# Patient Record
Sex: FEMALE | Race: WHITE | NOT HISPANIC OR LATINO | URBAN - METROPOLITAN AREA
[De-identification: names, ages, dates, MRNs, and addresses within clinical notes are randomized per-mention and may not be internally consistent; named-entity substitution may affect disease eponyms.]

---

## 2017-02-13 ENCOUNTER — IMPORTED ENCOUNTER (OUTPATIENT)
Dept: URBAN - METROPOLITAN AREA CLINIC 9 | Facility: CLINIC | Age: 77
End: 2017-02-13

## 2017-03-07 ENCOUNTER — IMPORTED ENCOUNTER (OUTPATIENT)
Dept: URBAN - METROPOLITAN AREA CLINIC 9 | Facility: CLINIC | Age: 77
End: 2017-03-07

## 2017-11-14 ENCOUNTER — IMPORTED ENCOUNTER (OUTPATIENT)
Dept: URBAN - METROPOLITAN AREA CLINIC 9 | Facility: CLINIC | Age: 77
End: 2017-11-14

## 2017-12-07 NOTE — PATIENT DISCUSSION
Reassurance.  Patient instructed to give more time to adapt to [facility] IOL.

## 2018-04-24 NOTE — PROCEDURE NOTE: SURGICAL
Prior to commencing surgery patient identification, surgical procedure, site, and side were confirmed by Dr. Earline Arrieta. Following topical proparacaine anesthesia, the patient was positioned at the YAG laser, a contact lens coupled to the cornea with methylcellulose and an axial posterior capsulotomy performed without complication using 3.0 Mj x 27. Excess methylcellulose was washed from the eye, one drop of Alphagan was instilled and the patient returned to the holding area having tolerated the procedure well and without complication. <br />[id number]

## 2018-05-04 NOTE — PATIENT DISCUSSION
Patient shown trial frame to show that OS is causing decrease in distance vision OU.  Patient informed on monofocal iol vs Symfony IOL and its affects on vision at distance and near.   Patient was shown what near vision would look like with monofocal iol with Lela goal OD.

## 2018-05-22 ENCOUNTER — IMPORTED ENCOUNTER (OUTPATIENT)
Dept: URBAN - METROPOLITAN AREA CLINIC 9 | Facility: CLINIC | Age: 78
End: 2018-05-22

## 2018-10-23 ENCOUNTER — IMPORTED ENCOUNTER (OUTPATIENT)
Dept: URBAN - METROPOLITAN AREA CLINIC 9 | Facility: CLINIC | Age: 78
End: 2018-10-23

## 2019-02-05 ENCOUNTER — IMPORTED ENCOUNTER (OUTPATIENT)
Dept: URBAN - METROPOLITAN AREA CLINIC 9 | Facility: CLINIC | Age: 79
End: 2019-02-05

## 2019-08-13 ENCOUNTER — IMPORTED ENCOUNTER (OUTPATIENT)
Dept: URBAN - METROPOLITAN AREA CLINIC 9 | Facility: CLINIC | Age: 79
End: 2019-08-13

## 2020-02-11 ENCOUNTER — IMPORTED ENCOUNTER (OUTPATIENT)
Dept: URBAN - METROPOLITAN AREA CLINIC 9 | Facility: CLINIC | Age: 80
End: 2020-02-11

## 2020-08-11 ENCOUNTER — IMPORTED ENCOUNTER (OUTPATIENT)
Dept: URBAN - METROPOLITAN AREA CLINIC 9 | Facility: CLINIC | Age: 80
End: 2020-08-11

## 2021-04-06 ENCOUNTER — IMPORTED ENCOUNTER (OUTPATIENT)
Dept: URBAN - METROPOLITAN AREA CLINIC 9 | Facility: CLINIC | Age: 81
End: 2021-04-06

## 2021-04-06 PROBLEM — H26.491: Noted: 2021-04-06

## 2021-04-06 PROBLEM — H01.021: Noted: 2021-04-06

## 2021-04-06 PROBLEM — H35.341: Noted: 2021-04-06

## 2021-04-06 PROBLEM — INACTIVE: Status: INACTIVE | Noted: 2021-04-06

## 2021-04-06 PROBLEM — H35.3131: Noted: 2021-04-06

## 2021-04-06 PROBLEM — H02.421: Noted: 2021-04-06

## 2021-04-06 PROBLEM — H01.024: Noted: 2021-04-06

## 2021-04-06 PROBLEM — H04.123: Noted: 2021-04-06

## 2021-10-18 ASSESSMENT — VISUAL ACUITY
OS_SC: 20/20 - SN
OD_SC: 20/25 +2 SN
OS_CC: 20/20 SN
OS_SC: 20/20 - SN
OD_SC: 20/20 SN
OD_CC: 20/20 SN
OD_CC: 20/25 - SN
OD_SC: 20/20 SN
OS_SC: 20/25 + SN
OS_SC: 20/20 SN
OS_SC: 20/20 - SN
OS_SC: 20/20 - SN
OD_SC: 20/25 SN
OS_SC: 20/20 - SN
OD_SC: 20/20 SN
OD_SC: 20/20 - SN
OS_SC: 20/20 -2 SN
OD_SC: 20/20 SN
OD_SC: 20/20 SN
OS_CC: 20/20 SN
OD_SC: 20/20 - SN
OS_SC: 20/20 SN
OS_SC: 20/20 SN
OD_SC: 20/20 SN

## 2021-10-18 ASSESSMENT — TONOMETRY
OS_IOP_MMHG: 13
OD_IOP_MMHG: 13
OD_IOP_MMHG: 11
OS_IOP_MMHG: 13
OS_IOP_MMHG: 14
OD_IOP_MMHG: 11
OS_IOP_MMHG: 12
OD_IOP_MMHG: 14
OS_IOP_MMHG: 11
OS_IOP_MMHG: 11
OS_IOP_MMHG: 14
OD_IOP_MMHG: 11
OD_IOP_MMHG: 14
OD_IOP_MMHG: 14
OD_IOP_MMHG: 13
OD_IOP_MMHG: 8
OS_IOP_MMHG: 10
OS_IOP_MMHG: 15

## 2021-10-18 ASSESSMENT — KERATOMETRY
OD_K1POWER_DIOPTERS: 45.25
OD_K2POWER_DIOPTERS: 45.5
OD_AXISANGLE2_DEGREES: 68
OS_K2POWER_DIOPTERS: 46.25
OS_AXISANGLE_DEGREES: 164
OS_AXISANGLE2_DEGREES: 74
OD_AXISANGLE_DEGREES: 158
OS_K1POWER_DIOPTERS: 45.25

## 2021-10-20 ENCOUNTER — PREPPED CHART (OUTPATIENT)
Dept: URBAN - METROPOLITAN AREA CLINIC 17 | Facility: CLINIC | Age: 81
End: 2021-10-20

## 2021-10-25 ENCOUNTER — ESTABLISHED COMPREHENSIVE EXAM (OUTPATIENT)
Dept: URBAN - METROPOLITAN AREA CLINIC 17 | Facility: CLINIC | Age: 81
End: 2021-10-25

## 2021-10-25 DIAGNOSIS — H26.491: ICD-10-CM

## 2021-10-25 DIAGNOSIS — H35.3131: ICD-10-CM

## 2021-10-25 PROCEDURE — 92134 CPTRZ OPH DX IMG PST SGM RTA: CPT

## 2021-10-25 PROCEDURE — 92014 COMPRE OPH EXAM EST PT 1/>: CPT

## 2021-10-25 ASSESSMENT — VISUAL ACUITY
OS_SC: 20/20-1
OD_SC: 20/20

## 2021-10-25 ASSESSMENT — TONOMETRY
OD_IOP_MMHG: 16
OS_IOP_MMHG: 16

## 2022-06-14 ENCOUNTER — ESTABLISHED PATIENT (OUTPATIENT)
Dept: URBAN - METROPOLITAN AREA CLINIC 17 | Facility: CLINIC | Age: 82
End: 2022-06-14

## 2022-06-14 DIAGNOSIS — H35.3131: ICD-10-CM

## 2022-06-14 DIAGNOSIS — H43.813: ICD-10-CM

## 2022-06-14 DIAGNOSIS — H26.491: ICD-10-CM

## 2022-06-14 PROCEDURE — 92134 CPTRZ OPH DX IMG PST SGM RTA: CPT

## 2022-06-14 PROCEDURE — 92014 COMPRE OPH EXAM EST PT 1/>: CPT

## 2022-06-14 ASSESSMENT — VISUAL ACUITY
OD_GLARE: 20/400
OD_SC: 20/25+1
OS_SC: 20/20-2

## 2022-06-14 ASSESSMENT — TONOMETRY
OS_IOP_MMHG: 13
OD_IOP_MMHG: 14

## 2022-06-20 RX ORDER — LEVOTHYROXINE SODIUM 0.07 MG/1
TABLET ORAL
COMMUNITY

## 2022-06-20 RX ORDER — MECLIZINE HYDROCHLORIDE 25 MG/1
TABLET ORAL
COMMUNITY

## 2023-06-13 ENCOUNTER — ESTABLISHED PATIENT (OUTPATIENT)
Dept: URBAN - METROPOLITAN AREA CLINIC 17 | Facility: CLINIC | Age: 83
End: 2023-06-13

## 2023-06-13 DIAGNOSIS — H26.491: ICD-10-CM

## 2023-06-13 DIAGNOSIS — H35.3131: ICD-10-CM

## 2023-06-13 DIAGNOSIS — H04.123: ICD-10-CM

## 2023-06-13 DIAGNOSIS — H43.813: ICD-10-CM

## 2023-06-13 PROCEDURE — 92014 COMPRE OPH EXAM EST PT 1/>: CPT

## 2023-06-13 PROCEDURE — 92134 CPTRZ OPH DX IMG PST SGM RTA: CPT

## 2023-06-13 ASSESSMENT — VISUAL ACUITY
OD_CC: J2
OS_CC: J1
OS_SC: 20/20-2
OD_SC: 20/25-1

## 2023-06-13 ASSESSMENT — TONOMETRY
OS_IOP_MMHG: 15
OD_IOP_MMHG: 17

## 2024-11-05 ENCOUNTER — COMPREHENSIVE EXAM (OUTPATIENT)
Dept: URBAN - METROPOLITAN AREA CLINIC 17 | Facility: CLINIC | Age: 84
End: 2024-11-05

## 2024-11-05 DIAGNOSIS — H01.021: ICD-10-CM

## 2024-11-05 DIAGNOSIS — H43.813: ICD-10-CM

## 2024-11-05 DIAGNOSIS — H35.3131: ICD-10-CM

## 2024-11-05 DIAGNOSIS — H26.491: ICD-10-CM

## 2024-11-05 DIAGNOSIS — H01.024: ICD-10-CM

## 2024-11-05 DIAGNOSIS — H04.123: ICD-10-CM

## 2024-11-05 PROCEDURE — 92134 CPTRZ OPH DX IMG PST SGM RTA: CPT

## 2024-11-05 PROCEDURE — 92014 COMPRE OPH EXAM EST PT 1/>: CPT
